# Patient Record
Sex: FEMALE | Race: ASIAN | Employment: PART TIME | URBAN - METROPOLITAN AREA
[De-identification: names, ages, dates, MRNs, and addresses within clinical notes are randomized per-mention and may not be internally consistent; named-entity substitution may affect disease eponyms.]

---

## 2019-07-08 ENCOUNTER — APPOINTMENT (OUTPATIENT)
Dept: CT IMAGING | Age: 29
End: 2019-07-08
Payer: COMMERCIAL

## 2019-07-08 ENCOUNTER — HOSPITAL ENCOUNTER (EMERGENCY)
Age: 29
Discharge: HOME | End: 2019-07-08
Attending: EMERGENCY MEDICINE
Payer: COMMERCIAL

## 2019-07-08 VITALS
BODY MASS INDEX: 20.32 KG/M2 | SYSTOLIC BLOOD PRESSURE: 108 MMHG | RESPIRATION RATE: 16 BRPM | TEMPERATURE: 97.7 F | WEIGHT: 119 LBS | DIASTOLIC BLOOD PRESSURE: 64 MMHG | HEIGHT: 64 IN | HEART RATE: 64 BPM | OXYGEN SATURATION: 100 %

## 2019-07-08 DIAGNOSIS — R10.10 PAIN OF UPPER ABDOMEN: Primary | ICD-10-CM

## 2019-07-08 LAB
ALBUMIN SERPL-MCNC: 4.3 G/DL (ref 3.5–4.6)
ALP BLD-CCNC: 68 U/L (ref 40–130)
ALT SERPL-CCNC: 32 U/L (ref 0–33)
ANION GAP SERPL CALCULATED.3IONS-SCNC: 14 MEQ/L (ref 9–15)
AST SERPL-CCNC: 28 U/L (ref 0–35)
BASOPHILS ABSOLUTE: 0.1 K/UL (ref 0–0.2)
BASOPHILS RELATIVE PERCENT: 0.6 %
BILIRUB SERPL-MCNC: <0.2 MG/DL (ref 0.2–0.7)
BILIRUBIN URINE: NEGATIVE
BLOOD, URINE: NEGATIVE
BUN BLDV-MCNC: 10 MG/DL (ref 6–20)
CALCIUM SERPL-MCNC: 9.3 MG/DL (ref 8.5–9.9)
CHLORIDE BLD-SCNC: 101 MEQ/L (ref 95–107)
CLARITY: CLEAR
CO2: 22 MEQ/L (ref 20–31)
COLOR: YELLOW
CREAT SERPL-MCNC: 0.56 MG/DL (ref 0.5–0.9)
EOSINOPHILS ABSOLUTE: 0 K/UL (ref 0–0.7)
EOSINOPHILS RELATIVE PERCENT: 0.2 %
GFR AFRICAN AMERICAN: >60
GFR NON-AFRICAN AMERICAN: >60
GLOBULIN: 4 G/DL (ref 2.3–3.5)
GLUCOSE BLD-MCNC: 106 MG/DL (ref 70–99)
GLUCOSE URINE: NEGATIVE MG/DL
HCG, URINE, POC: NEGATIVE
HCT VFR BLD CALC: 38.8 % (ref 37–47)
HEMOGLOBIN: 13.1 G/DL (ref 12–16)
KETONES, URINE: NEGATIVE MG/DL
LACTIC ACID: 2.2 MMOL/L (ref 0.5–2.2)
LEUKOCYTE ESTERASE, URINE: NEGATIVE
LIPASE: 23 U/L (ref 12–95)
LYMPHOCYTES ABSOLUTE: 1.2 K/UL (ref 1–4.8)
LYMPHOCYTES RELATIVE PERCENT: 9 %
Lab: NORMAL
MCH RBC QN AUTO: 30.2 PG (ref 27–31.3)
MCHC RBC AUTO-ENTMCNC: 33.8 % (ref 33–37)
MCV RBC AUTO: 89.4 FL (ref 82–100)
MONOCYTES ABSOLUTE: 0.5 K/UL (ref 0.2–0.8)
MONOCYTES RELATIVE PERCENT: 3.7 %
NEGATIVE QC PASS/FAIL: NORMAL
NEUTROPHILS ABSOLUTE: 11.8 K/UL (ref 1.4–6.5)
NEUTROPHILS RELATIVE PERCENT: 86.5 %
NITRITE, URINE: NEGATIVE
PDW BLD-RTO: 12.9 % (ref 11.5–14.5)
PH UA: 8.5 (ref 5–9)
PLATELET # BLD: 266 K/UL (ref 130–400)
POSITIVE QC PASS/FAIL: NORMAL
POTASSIUM SERPL-SCNC: 4.5 MEQ/L (ref 3.4–4.9)
PROTEIN UA: NEGATIVE MG/DL
RBC # BLD: 4.34 M/UL (ref 4.2–5.4)
SODIUM BLD-SCNC: 137 MEQ/L (ref 135–144)
SPECIFIC GRAVITY UA: 1.02 (ref 1–1.03)
TOTAL PROTEIN: 8.3 G/DL (ref 6.3–8)
URINE REFLEX TO CULTURE: NORMAL
UROBILINOGEN, URINE: 0.2 E.U./DL
WBC # BLD: 13.6 K/UL (ref 4.8–10.8)

## 2019-07-08 PROCEDURE — 6360000002 HC RX W HCPCS: Performed by: EMERGENCY MEDICINE

## 2019-07-08 PROCEDURE — 81003 URINALYSIS AUTO W/O SCOPE: CPT

## 2019-07-08 PROCEDURE — 83605 ASSAY OF LACTIC ACID: CPT

## 2019-07-08 PROCEDURE — 74177 CT ABD & PELVIS W/CONTRAST: CPT

## 2019-07-08 PROCEDURE — 99284 EMERGENCY DEPT VISIT MOD MDM: CPT

## 2019-07-08 PROCEDURE — 6360000004 HC RX CONTRAST MEDICATION: Performed by: EMERGENCY MEDICINE

## 2019-07-08 PROCEDURE — 83690 ASSAY OF LIPASE: CPT

## 2019-07-08 PROCEDURE — 96372 THER/PROPH/DIAG INJ SC/IM: CPT

## 2019-07-08 PROCEDURE — 2580000003 HC RX 258: Performed by: EMERGENCY MEDICINE

## 2019-07-08 PROCEDURE — 85025 COMPLETE CBC W/AUTO DIFF WBC: CPT

## 2019-07-08 PROCEDURE — 36415 COLL VENOUS BLD VENIPUNCTURE: CPT

## 2019-07-08 PROCEDURE — 6370000000 HC RX 637 (ALT 250 FOR IP): Performed by: EMERGENCY MEDICINE

## 2019-07-08 PROCEDURE — 80053 COMPREHEN METABOLIC PANEL: CPT

## 2019-07-08 PROCEDURE — 96374 THER/PROPH/DIAG INJ IV PUSH: CPT

## 2019-07-08 RX ORDER — 0.9 % SODIUM CHLORIDE 0.9 %
1000 INTRAVENOUS SOLUTION INTRAVENOUS ONCE
Status: COMPLETED | OUTPATIENT
Start: 2019-07-08 | End: 2019-07-08

## 2019-07-08 RX ORDER — FAMOTIDINE 20 MG/1
20 TABLET, FILM COATED ORAL ONCE
Status: COMPLETED | OUTPATIENT
Start: 2019-07-08 | End: 2019-07-08

## 2019-07-08 RX ORDER — DICYCLOMINE HYDROCHLORIDE 10 MG/ML
20 INJECTION INTRAMUSCULAR ONCE
Status: COMPLETED | OUTPATIENT
Start: 2019-07-08 | End: 2019-07-08

## 2019-07-08 RX ORDER — ONDANSETRON 2 MG/ML
4 INJECTION INTRAMUSCULAR; INTRAVENOUS ONCE
Status: COMPLETED | OUTPATIENT
Start: 2019-07-08 | End: 2019-07-08

## 2019-07-08 RX ORDER — HYOSCYAMINE SULFATE 0.12 MG/1
0.12 TABLET SUBLINGUAL EVERY 4 HOURS PRN
Qty: 20 EACH | Refills: 0 | Status: SHIPPED | OUTPATIENT
Start: 2019-07-08

## 2019-07-08 RX ORDER — ONDANSETRON 4 MG/1
4 TABLET, ORALLY DISINTEGRATING ORAL EVERY 8 HOURS PRN
Qty: 20 TABLET | Refills: 0 | Status: SHIPPED | OUTPATIENT
Start: 2019-07-08

## 2019-07-08 RX ADMIN — IOPAMIDOL 100 ML: 612 INJECTION, SOLUTION INTRAVENOUS at 11:27

## 2019-07-08 RX ADMIN — SODIUM CHLORIDE 1000 ML: 9 INJECTION, SOLUTION INTRAVENOUS at 09:28

## 2019-07-08 RX ADMIN — ONDANSETRON 4 MG: 2 INJECTION INTRAMUSCULAR; INTRAVENOUS at 09:28

## 2019-07-08 RX ADMIN — DICYCLOMINE HYDROCHLORIDE 20 MG: 20 INJECTION, SOLUTION INTRAMUSCULAR at 09:36

## 2019-07-08 RX ADMIN — FAMOTIDINE 20 MG: 20 TABLET, FILM COATED ORAL at 10:35

## 2019-07-08 ASSESSMENT — PAIN DESCRIPTION - PAIN TYPE
TYPE: ACUTE PAIN
TYPE: ACUTE PAIN

## 2019-07-08 ASSESSMENT — ENCOUNTER SYMPTOMS
EYE PAIN: 0
DIARRHEA: 0
NAUSEA: 1
VOMITING: 1
CHEST TIGHTNESS: 0
SHORTNESS OF BREATH: 0
SORE THROAT: 0
ABDOMINAL PAIN: 1

## 2019-07-08 ASSESSMENT — PAIN DESCRIPTION - LOCATION
LOCATION: ABDOMEN
LOCATION: ABDOMEN

## 2019-07-08 ASSESSMENT — PAIN DESCRIPTION - ORIENTATION
ORIENTATION: MID;RIGHT
ORIENTATION: MID;UPPER;RIGHT

## 2019-07-08 ASSESSMENT — PAIN SCALES - GENERAL
PAINLEVEL_OUTOF10: 4
PAINLEVEL_OUTOF10: 7

## 2023-08-15 NOTE — ED PROVIDER NOTES
[address] [facility] ED  eMERGENCY dEPARTMENTeNCOUnter      Pt Name: Christa Larson  MRN: [id number]  [place] 1990  Date ofevaluation: 7/8/2019  Provider: Cheryl Trevizo DO    CHIEF COMPLAINT       Chief Complaint   Patient presents with    Abdominal Pain     with n/v since 0500         HISTORY OF PRESENT ILLNESS   (Location/Symptom, Timing/Onset,Context/Setting, Quality, Duration, Modifying Factors, Severity)  Note limiting factors. Christa Larson is a 34 y.o. female who presents to the emergency department . Patient comes in because she woke up with upper abdominal pain and nausea and vomiting since 5 in the morning. The last thing she ate was a bowl of cereal with milk last night. She has been feeling well until this morning. She did not take anything for the discomfort because she was nauseated. This has not happened to her in the past.  She does not have any gastrointestinal issues. Normal bowel movements. No urinary symptoms. HPI    NursingNotes were reviewed. REVIEW OF SYSTEMS    (2-9 systems for level 4, 10 or more for level 5)     Review of Systems   Constitutional: Negative for activity change, appetite change and fatigue. HENT: Negative for congestion and sore throat. Eyes: Negative for pain and visual disturbance. Respiratory: Negative for chest tightness and shortness of breath. Cardiovascular: Negative for chest pain. Gastrointestinal: Positive for abdominal pain, nausea and vomiting. Negative for diarrhea. Endocrine: Negative for polydipsia. Genitourinary: Negative for flank pain and urgency. Musculoskeletal: Negative for gait problem and neck stiffness. Skin: Negative for rash. Neurological: Negative for weakness, light-headedness and headaches. Psychiatric/Behavioral: Negative for confusion and sleep disturbance. Except as noted above the remainder of the review of systems was reviewed and negative. PAST MEDICAL HISTORY   History reviewed. Cardiac Cath Lab Discharge Instructions    Groin:  -Your angiogram puncture site may be tender and/or bruised. If you have any bleeding, site becomes swollen, hard, begins oozing blood or bruising significantly increases, apply direct pressure with your hand and call 911 immediately. Do not drive yourself to the hospital.   -If your puncture site becomes reddened, warm, swollen, or more painful, has drainage, or you develop a fever greater than 101 F, contact your attending physician immediately. If unable to reach your physician, come to the [facility] Emergency Room.   -If you experience any new shortness of breath, acute abdominal pain, sudden onset of shoulder pain or chest pain, call 911 immediately. Do not drive yourself to the hospital.   -After 24 hours, remove the dressing. Gently cleanse the site with soap and water. Pat the area dry.  -You may shower after 24 hours. Do not soak in a hot tub, tub bath, or swimming pool until the skin site is healed (usually 5-7 days). Soaking in water can increase the risk of infection at the insertion site.  -No heavy lifting/pushing (greater than 5-10 pounds) for the next 3-4 days.  -Avoid bending at the hip, climbing, or other vigorous activity. Keep affected leg extended on the way home and at home for the first 24 hours.    Vascular Closure Device  -If you have received a closure device such as Angio-Seal, Perclose, or Vascade, you may feel a pea sized lump and/or mild tenderness for the first 24 hours.    WRIST/ARM  -You should expect mild tingling in your hand and tenderness at the puncture site for up to 3 days.  If this worsens or persists longer than 3 days, your puncture site becomes reddened, warm, swollen, or more painful, your site has drainage or you develop a fever greater than 101 F, contact your attending physician immediately.  If unable to reach your physician, come to [facility] Emergency Room.   -If you have any bleeding, the  site becomes swollen or hard, begins oozing blood or bruising significantly increases, sit down and apply firm, continuous pressure with your fingers at the puncture site for 10 minutes.  If the bleeding stops, continue to sit quietly, keeping your wrist straight for 2 hours.  Notify your physician.  If the bleeding does not stop after 10 minutes or there is a large amount of bleeding, call 911 immediately. Do not drive yourself to the hospital.   -For the first 24 hours do not subject the affected hand/arm to any forceful movements such as supporting your weight when rising from a chair or bed.     After 24 hours:  -Remove the dressing. Gently cleanse the site with mild soap and water. Pat the area dry.  The puncture site may be left open to air.  If there is minor oozing, cover with bandaid and remove after 12 hours.  -You may shower. Do not use a hot tub, tub bath, or swimming pool until the skin site is healed (usually 3 to 5 days). Soaking in water can increase the risk of infection at the insertion site.     For the next 48 hours:  -No lifting/pushing/pulling anything heavier than 3 to 5 pounds with the affected arm.   -Do not operate a lawnmower, , power tools, motorcycle or all-terrain vehicle for 48 hours.  -Avoid excessive wrist movement (flexion and extension) such as supporting weight when rising from a chair or bed, push-ups, lifting garage doors, etc.  -Do not engage in vigorous exercise such as bowling, tennis, or golf using the affected arm.    Activity  It is recommended to have an adult stay with you for the first 24 hours.  Rest at home for the next 24 hours. Keep your activity limited.  You may resume driving and limited activity     Post Sedation Instructions  We strongly recommend having a responsible adult stay with you for the next 24 hours. You may feel sleepy for the next 24 hours.  For the next 24 hours:  Do NOT drink alcoholic beverages.  Do NOT smoke.  Do NOT make important  decisions or sign important papers.  Do NOT drive a car, operate machinery, or power tools.       Diet/medications   -Resume previous diet and medications upon returning home, unless your physician has specified otherwise.  -In the first 24-48 hours after the procedure, drink plenty of fluids to flush the x-ray dye through your system.  -If you are taking Metformin, or a medication containing metformin (I.e. Glucophage, Glucovance, Avandamet, or Metaglip), do not resume taking this medication for 48 hours after the procedure.     labs were within normal range or not returned as of this dictation. EMERGENCY DEPARTMENT COURSE and DIFFERENTIAL DIAGNOSIS/MDM:   Vitals:    Vitals:    07/08/19 0854   BP: 115/75   Pulse: 86   Resp: 22   Temp: 97.7 °F (36.5 °C)   TempSrc: Oral   SpO2: 96%   Weight: 119 lb (54 kg)   Height: 5' 4\" (1.626 m)       She comes in with nausea vomiting and upper abdominal pain. Most likely this is all viral.  Patient will be sent home on Zofran and Levsin and follow-up with gastroenterology if necessary    [facility]      [address] time was 0 minutes, excluding separatelyreportable procedures. There was a high probability ofclinically significant/life threatening deterioration in the patient's condition which required my urgent intervention. CONSULTS:  None    PROCEDURES:  Unless otherwise noted below, none     Procedures    FINAL IMPRESSION      1. Pain of upper abdomen          DISPOSITION/PLAN   DISPOSITION        PATIENT REFERREDTO:  Acosta Santillan MD  [address] [address]  [phone]    Schedule an appointment as soon as possible for a visit   If symptoms worsen      DISCHARGEMEDICATIONS:  New Prescriptions    HYOSCYAMINE SULFATE SL (LEVSIN/SL) 0.125 MG SUBL    Place 0.125 mg under the tongue every 4 hours as needed (abd pain)    ONDANSETRON (ZOFRAN ODT) 4 MG DISINTEGRATING TABLET    Take 1 tablet by mouth every 8 hours as needed for Nausea     Controlled Substances Monitoring:     No flowsheet data found.     (Please note that portions of this note were completed with a voice recognition program.  Efforts were made to edit the dictations but occasionally words are mis-transcribed.)    Renuka Nunez DO (electronically signed)  Attending Emergency Physician          Renuka Nunez DO  07/08/19 4414